# Patient Record
Sex: MALE | Race: WHITE | Employment: FULL TIME | URBAN - METROPOLITAN AREA
[De-identification: names, ages, dates, MRNs, and addresses within clinical notes are randomized per-mention and may not be internally consistent; named-entity substitution may affect disease eponyms.]

---

## 2019-07-24 ENCOUNTER — APPOINTMENT (OUTPATIENT)
Dept: GENERAL RADIOLOGY | Facility: CLINIC | Age: 49
End: 2019-07-24
Attending: EMERGENCY MEDICINE
Payer: COMMERCIAL

## 2019-07-24 ENCOUNTER — HOSPITAL ENCOUNTER (EMERGENCY)
Facility: CLINIC | Age: 49
Discharge: HOME | End: 2019-07-24
Attending: EMERGENCY MEDICINE | Admitting: EMERGENCY MEDICINE
Payer: COMMERCIAL

## 2019-07-24 VITALS
DIASTOLIC BLOOD PRESSURE: 99 MMHG | TEMPERATURE: 98.4 F | SYSTOLIC BLOOD PRESSURE: 132 MMHG | OXYGEN SATURATION: 99 % | RESPIRATION RATE: 16 BRPM | HEART RATE: 64 BPM

## 2019-07-24 DIAGNOSIS — S68.119A TRAUMATIC AMPUTATION OF FINGERTIP, INITIAL ENCOUNTER: ICD-10-CM

## 2019-07-24 PROCEDURE — 90471 IMMUNIZATION ADMIN: CPT | Performed by: EMERGENCY MEDICINE

## 2019-07-24 PROCEDURE — 99284 EMERGENCY DEPT VISIT MOD MDM: CPT | Mod: 25 | Performed by: EMERGENCY MEDICINE

## 2019-07-24 PROCEDURE — 25000131 ZZH RX MED GY IP 250 OP 636 PS 637: Performed by: EMERGENCY MEDICINE

## 2019-07-24 PROCEDURE — 25000125 ZZHC RX 250

## 2019-07-24 PROCEDURE — 12041 INTMD RPR N-HF/GENIT 2.5CM/<: CPT | Performed by: EMERGENCY MEDICINE

## 2019-07-24 PROCEDURE — 11044 DBRDMT BONE 1ST 20 SQ CM/<: CPT | Performed by: EMERGENCY MEDICINE

## 2019-07-24 PROCEDURE — 99284 EMERGENCY DEPT VISIT MOD MDM: CPT | Mod: Z6 | Performed by: EMERGENCY MEDICINE

## 2019-07-24 PROCEDURE — 96365 THER/PROPH/DIAG IV INF INIT: CPT | Performed by: EMERGENCY MEDICINE

## 2019-07-24 PROCEDURE — 25000128 H RX IP 250 OP 636: Performed by: EMERGENCY MEDICINE

## 2019-07-24 PROCEDURE — 90715 TDAP VACCINE 7 YRS/> IM: CPT | Performed by: EMERGENCY MEDICINE

## 2019-07-24 PROCEDURE — 73130 X-RAY EXAM OF HAND: CPT | Mod: LT

## 2019-07-24 RX ORDER — CEPHALEXIN 500 MG/1
500 CAPSULE ORAL 4 TIMES DAILY
Qty: 28 CAPSULE | Refills: 0 | Status: SHIPPED | OUTPATIENT
Start: 2019-07-24 | End: 2019-08-09

## 2019-07-24 RX ORDER — LIDOCAINE HYDROCHLORIDE 10 MG/ML
INJECTION, SOLUTION INFILTRATION; PERINEURAL
Status: COMPLETED
Start: 2019-07-24 | End: 2019-07-24

## 2019-07-24 RX ORDER — HYDROCODONE BITARTRATE AND ACETAMINOPHEN 5; 325 MG/1; MG/1
1 TABLET ORAL EVERY 6 HOURS PRN
Qty: 4 TABLET | Refills: 0 | Status: SHIPPED | OUTPATIENT
Start: 2019-07-24 | End: 2019-07-27

## 2019-07-24 RX ORDER — LIDOCAINE HYDROCHLORIDE 10 MG/ML
20 INJECTION, SOLUTION INFILTRATION; PERINEURAL ONCE
Status: COMPLETED | OUTPATIENT
Start: 2019-07-24 | End: 2019-07-24

## 2019-07-24 RX ORDER — CEFAZOLIN SODIUM 1 G/3ML
1 INJECTION, POWDER, FOR SOLUTION INTRAMUSCULAR; INTRAVENOUS ONCE
Status: COMPLETED | OUTPATIENT
Start: 2019-07-24 | End: 2019-07-24

## 2019-07-24 RX ORDER — BUPIVACAINE HYDROCHLORIDE 5 MG/ML
INJECTION, SOLUTION EPIDURAL; INTRACAUDAL
Status: DISCONTINUED
Start: 2019-07-24 | End: 2019-07-24 | Stop reason: HOSPADM

## 2019-07-24 RX ORDER — ONDANSETRON 8 MG/1
8 TABLET, ORALLY DISINTEGRATING ORAL ONCE
Status: COMPLETED | OUTPATIENT
Start: 2019-07-24 | End: 2019-07-24

## 2019-07-24 RX ORDER — IBUPROFEN 600 MG/1
600 TABLET, FILM COATED ORAL EVERY 8 HOURS PRN
Qty: 30 TABLET | Refills: 0 | Status: SHIPPED | OUTPATIENT
Start: 2019-07-24 | End: 2019-08-09

## 2019-07-24 RX ADMIN — ONDANSETRON 8 MG: 8 TABLET, ORALLY DISINTEGRATING ORAL at 18:01

## 2019-07-24 RX ADMIN — LIDOCAINE HYDROCHLORIDE: 10 INJECTION, SOLUTION INFILTRATION; PERINEURAL at 18:05

## 2019-07-24 RX ADMIN — CLOSTRIDIUM TETANI TOXOID ANTIGEN (FORMALDEHYDE INACTIVATED), CORYNEBACTERIUM DIPHTHERIAE TOXOID ANTIGEN (FORMALDEHYDE INACTIVATED), BORDETELLA PERTUSSIS TOXOID ANTIGEN (GLUTARALDEHYDE INACTIVATED), BORDETELLA PERTUSSIS FILAMENTOUS HEMAGGLUTININ ANTIGEN (FORMALDEHYDE INACTIVATED), BORDETELLA PERTUSSIS PERTACTIN ANTIGEN, AND BORDETELLA PERTUSSIS FIMBRIAE 2/3 ANTIGEN 0.5 ML: 5; 2; 2.5; 5; 3; 5 INJECTION, SUSPENSION INTRAMUSCULAR at 19:05

## 2019-07-24 RX ADMIN — CEFAZOLIN 1 G: 1 INJECTION, POWDER, FOR SOLUTION INTRAMUSCULAR; INTRAVENOUS at 18:58

## 2019-07-24 ASSESSMENT — ENCOUNTER SYMPTOMS
WOUND: 1
NAUSEA: 1

## 2019-07-24 NOTE — ED AVS SNAPSHOT
[facility], [place], Emergency Department  [address]  [facility] [place] [address]  Phone:  [phone]  Fax:  [phone]                                    Jose Francisco Cain   MRN: [id number]    Department:  [facility], Emergency Department   Date of Visit:  7/24/2019           After Visit Summary Signature Page    I have received my discharge instructions, and my questions have been answered. I have discussed any challenges I see with this plan with the nurse or doctor.    ..........................................................................................................................................  Patient/Patient Representative Signature      ..........................................................................................................................................  Patient Representative Print Name and Relationship to Patient    ..................................................               ................................................  Date                                   Time    ..........................................................................................................................................  Reviewed by Signature/Title    ...................................................              ..............................................  Date                                               Time          22EPIC Rev 08/18

## 2019-07-24 NOTE — CONSULTS
[facility]  ORTHOPAEDIC SURGERY CONSULT - HISTORY AND PHYSICAL    DATE OF CONSULT: 7/24/2019 6:14 PM    REQUESTING PROVIDER: Margret Kauffman *, MD - [facility] Staff.    CC: left hand injury     DATE OF INJURY: 07/24/19     HISTORY OF PRESENT ILLNESS:   Jose Francisco Cain is a 48 year old left-hand dominant male with no significant PMH who presents with a left hand injury.  Patient says that he was trimming his bushes earlier today with a power kaylynn when the tip of his left ring digit was caught in the power kaylynn.  This happened approximately at 5:30 PM.  His daughter brought him to the emergency room after putting his fingertip onto ice.  Prior to orthopedic evaluation he was anesthetized by the ER.  He reports no motor dysfunction.    Nursing and physician Emergency Department notes reviewed and HPI updated to reflect their ED course.     PAST MEDICAL HISTORY:     Patient denies any personal history of bleeding disorders, clotting disorders, or adverse reactions to anesthesia.    PAST SURGICAL HISTORY:   Denies    MEDICATIONS:   Prior to Admission medications    Medication Sig Last Dose Taking? Auth Provider   ANUSOL-HC 25 MG RE SUPP  1 MD QD AFTER A BM   Riley Carreon MD   BUTALBITAL-ACETAMINOPHEN  MG OR TABS 1 TABLET EVERY 4 HOURS AS NEEDED   Riley Carreon MD   FLONASE INHA 50 MCG/DOSE NA INHALE 2 SPRAYS IN EACH NOSTRIL ONCE DAILY   Riley Carreon MD   LEVITRA 20 MG OR TABS 1/2 TO 1 AS NEEDED   Riley Carreon MD   LOMOTIL 2.5-0.025 MG OR TABS 1 TABLET 4 TIMES DAILY AS NEEDED   Soniya Barbosa PA-C   MEDROL (KARY) 4 MG OR TABS AS DIRECTED   Riley Carreon MD   MIDRIN 325- MG OR CAPS ONE TO TWO CAPSULES EVERY 4 HOURS AS NEEDED   Riley Carreon MD   OVIDE 0.5 % EX LOTN apply overnight for self and 3 family members, repeat in 2 weeks   Riley Carreon MD   propranolol (INDERAL) 10 MG tablet Take  by mouth. 1 or 2 as needed before speech   Roxana  Jourdan KOO MD   VANCOCIN  MG OR CAPS 2 PO TID X 7 DAYS   Riley Carreon MD   VIAGRA 100 MG OR TABS 1 TABLET DAILY AS NEEDED   Riley Carreon MD   VICODIN 5-500 MG OR TABS ONE TO TWO TABLETS EVERY 4 TO 6 HOURS AS NEEDED FOR PAIN   Riley Carreon MD       ALLERGIES:   No known drug allergies    SOCIAL HISTORY:   Occupation:       FAMILY HISTORY:  Patient denies known family history of bleeding, clotting, or anesthesia related complications.     REVIEW OF SYSTEMS:   Otherwise, a 10-point reviews of systems was negative except as noted above in the HPI.     PHYSICAL EXAM:   Vitals:    07/24/19 1808   BP: 117/75   Pulse: 87   Resp: 16   Temp: 98  F (36.7  C)   TempSrc: Oral   SpO2: 100%     General: Awake, alert, appropriate, following commands, NAD.  Neuro: CN II-XII grossly intact.   Psych: Appropriate affect.   Skin: No rashes,  skin color normal.  HEENT: Normal.   Lungs: Breathing comfortably and nonlabored, no wheezes or stridor noted.  Heart/Cardiovascular: Regular pulse, no peripheral cyanosis.    Left Upper Extremity:     Focused exam of the patient's left ring finger shows a traumatic laceration through the distal phalanx.  There is associated bone exposed.  There is no pulsatile bleeding.  The patient's sensation is diminished due to previous anesthesia.  He fires FDP and FDS independently to all fingers.  Fires FPL, EPL.  Sensation is intact in all other digits.  He also has 2 small lacerations over the tuft of the long finger.  See pictures below.          LABS:  Hemoglobin   Date Value Ref Range Status   02/27/2004 15.7 13.3 - 17.7 g/dL Final   07/13/2003 15.1 13.3 - 17.7 g/dL Final     WBC   Date Value Ref Range Status   09/07/2007 8.0 4.0 - 11.0 10e9/L Final     Platelet Count   Date Value Ref Range Status   02/27/2004 227 150 - 450 10e9/L Final     No results found for: INR  Creatinine   Date Value Ref Range Status   09/07/2007 1.13 0.80 - 1.50 mg/dL Final     Glucose   Date  Value Ref Range Status   2007 94 60 - 99 mg/dL Final       IMAGIN views of the patient's left hand are reviewed today and are remarkable for transverse amputation through the distal phalanx of the left ring finger.  Associated soft tissue defect    ASSESSMENT AND PLAN:   Jose Francisco Cain is a 48 year old left-hand dominant male who sustained a left distal phalanx traumatic amputation and laceration to distal middle finger.     The patient underwent revision amputation of the distal phalanx with primary closure.  See separate procedure note below. We discussed that these injuries typically heal well.  We encouraged patient to keep his dressing clean dry and intact until Monday.  [place]  We will see him in clinic in approximately 5 to 7 days for wound check.  Red flag signs including drainage from the laceration, redness, fevers, chills and other symptoms were described.  He and his family verbalized understanding.         Assessment and Plan discussed with Dr. Haylie Car MD  Orthopedic Surgery PGY-4  [phone]      Verbal consent was obtained prior to the patient's procedure.  Risks including damage to nearby neurovascular structures, infection, hook nail deformities, necrosis of the finger and other complications were explained.  The patient underwent re-anesthetic with approximately 10 cc of half percent Marcaine without epinephrine and 1% lidocaine without epinephrine for a metacarpal block.  Once he was adequately anesthetized, the patient's wounds were then copiously irrigated with sterile saline.  The hand was then cleansed with Betadine solution and a rongeur was used to trim away any bony prominence from the ring finger. The remainder of the nail matrix was also removed.  The wounds were then closed in interrupted fashion using 4-0 chromic gut.  The patient's long finger laceration was also reapproximated with 4-0 chromic gut.  A  sterile dressing consisting of Xeroform, 4 x 4's, Kerlix and Coban was applied.  Patient was neurovascularly intact throughout the whole procedure.  He tolerated this well.

## 2019-07-24 NOTE — ED PROVIDER NOTES
[facility] EMERGENCY DEPARTMENT ([facility])    7/24/19          History     Chief Complaint   Patient presents with     Hand Injury     Ring finger amputation     The history is provided by the patient, a relative and medical records.     Jose Francisco Cain is a 48 year old male with no significant past medical history who presents here to the Emergency Department due to a the tip of the patients left hand 4th digit being cut off. Patient reports that he was trimming his bushes earlier today with a power kaylynn when the tip of his 4th digit on his left hand had gotten pulled into the timer and had been chopped off. Patient reports that this happened 20 minutes ago. Patient reports being left handed. Patient reports a lot of pain at the area and is reporting associated nausea. 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 Patient had put the tip of his finger on ice and brought it here. Patients last tetanus was on 1/5/2011.    I have reviewed the Medications, Allergies, Past Medical and Surgical History, and Social History in the Epic system.    No past medical history on file.    No past surgical history on file.    No family history on file.    Social History     Tobacco Use     Smoking status: Never Smoker   Substance Use Topics     Alcohol use: Not on file       Current Facility-Administered Medications   Medication     lidocaine 1 % injection     lidocaine 1 % injection 20 mL     Current Outpatient Medications   Medication     ANUSOL-HC 25 MG RE SUPP     BUTALBITAL-ACETAMINOPHEN  MG OR TABS     FLONASE INHA 50 MCG/DOSE NA     LEVITRA 20 MG OR TABS     LOMOTIL 2.5-0.025 MG OR TABS     MEDROL (KARY) 4 MG OR TABS     MIDRIN 325- MG OR CAPS     OVIDE 0.5 % EX LOTN     propranolol (INDERAL) 10 MG tablet     VANCOCIN  MG OR CAPS     VIAGRA 100 MG OR TABS     VICODIN 5-500 MG OR TABS        Allergies   Allergen Reactions     No Known Drug Allergies          Review of  Systems   Gastrointestinal: Positive for nausea.   Musculoskeletal:        Positive for left hand 4th digit pain   Skin: Positive for wound (Tip of 4th digit on left hand cut off).   All other systems reviewed and are negative.      Physical Exam          Physical Exam   Musculoskeletal:   Left 4th finger/ring finger with amputation distal to DIP joint; white bone visible;  Mild bleeding;  Able to flex and extend PIP joint;  Middle finger with 2 small lacerations to tip of finger;    Physical Exam   Constitutional: oriented to person, place, and time. appears well-developed and well-nourished.   HENT:   Head: Normocephalic and atraumatic.   Neck: Normal range of motion.   Pulmonary/Chest: Effort normal. No respiratory distress.   Neurological: alert and oriented to person, place, and time.   Skin: Skin is warm and dry.   Psychiatric:  normal mood and affect.  behavior is normal. Thought content normal.       ED Course   5:52 PM  The patient was seen and examined by Margret Kauffman MD in Room ED10.        Procedures             Critical Care time:  none         Results for orders placed or performed during the hospital encounter of 07/24/19   XR Hand Left G/E 3 Views    Narrative    LEFT HAND THREE VIEWS   7/24/2019 6:20 PM    HISTORY: Amputation of the distal phalanx of the ring finger.    COMPARISON: None.      Impression    IMPRESSION: 3 views of the left hand and 3 additional views of the  amputated digit were obtained. There is amputation of the distal half  of the distal phalanx of the ring finger including the soft tissues  and bone. No other abnormality is seen.     TATO CARBONE MD     Medications   ondansetron (ZOFRAN-ODT) ODT tab 8 mg (8 mg Oral Given 7/24/19 1801)   lidocaine 1 % injection 20 mL ( Intradermal Given by Other Clinician 7/24/19 1805)   ceFAZolin (ANCEF) 1 g vial to attach to  ml bag for ADULT or 50 ml bag for PEDS (0 g Intravenous Stopped 7/24/19 1935)   Tdap  (tetanus-diphtheria-acell pertussis) (ADACEL) injection 0.5 mL (0.5 mLs Intramuscular Given 7/24/19 3527)            Labs Ordered and Resulted from Time of ED Arrival Up to the Time of Departure from the ED - No data to display    No results found for this or any previous visit (from the past 24 hour(s)).         Assessments & Plan (with Medical Decision Making):   Patient is a 48-year-old male who presented to the ER due to a left ring finger amputation.  He was cutting some hedges at home and ended up cutting off the tip of his left phalanx.  He had a distal phalanx amputation just distal to the DIP joint.  I contacted orthopedics who came and evaluated the patient.  I initially performed a digital block for pain control.  He obtained some x-rays that confirmed the amputation.  Orthopedics does not feel that they can reimplant to the tip of this finger.  Patient's finger was rongeured and closed by orthopedics.  Patient did receive 1 dose of IV antibiotics in the ER and will be discharged home with some Keflex and some pain medications.  Patient stable for discharge.  This part of the medical record was transcribed by Dat Traore, Medical Scribe, from a dictation done by Margret Kauffman MD.      I have reviewed the nursing notes.    I have reviewed the findings, diagnosis, plan and need for follow up with the patient.       Medication List      There are no discharge medications for this visit.         Final diagnoses:   Traumatic amputation of fingertip, initial encounter     Dat JOHNSTON, am serving as a trained medical scribe to document services personally performed by Margret Kauffman MD, based on the provider's statements to me.   Margret JOHNSTON MD, was physically present and have reviewed and verified the accuracy of this note documented by Dat Traore.    7/24/2019   [facility], [place], EMERGENCY DEPARTMENT     Margret Kauffman MD  07/25/19 0026

## 2019-07-25 ENCOUNTER — TELEPHONE (OUTPATIENT)
Dept: ORTHOPEDICS | Facility: CLINIC | Age: 49
End: 2019-07-25

## 2019-07-25 NOTE — DISCHARGE INSTRUCTIONS
Keep finger clean and dry.     Take antibiotics as prescribed.     Please make an appointment to follow up with Your Primary Care Provider and Orthopedics (phone: [phone]) in 5-7 days.

## 2019-07-25 NOTE — TELEPHONE ENCOUNTER
L ring finger traumatic amputation now s/p revision in the ER on 07/24/19.    double booked on Dr. Liu's schedule on Monday 7/29/19 at 10:30 am or 2:00 pm

## 2019-07-26 ENCOUNTER — TELEPHONE (OUTPATIENT)
Dept: ORTHOPEDICS | Facility: CLINIC | Age: 49
End: 2019-07-26

## 2019-07-26 NOTE — TELEPHONE ENCOUNTER
----- Message from Henna Sneed ATC sent at 7/25/2019  8:23 AM CDT -----  This patient can be double booked on Dr. Liu's schedule on Monday 7/29/19 at 10:30 am or 2:00 pm. They also need insurance info put into the chart. Thanks!  ----- Message -----  From: Nydia Car MD  Sent: 7/24/2019   8:47 PM  To: [facility]    Jose Francisco Cain is a 48 year old M with L ring finger traumatic amputation now s/p revision in the ER on 07/24/19.     Recommend wound check with Dr. Stallings early next week(7/29) would be ideal.     Thanks!     Nydia Car

## 2019-08-09 ENCOUNTER — OFFICE VISIT (OUTPATIENT)
Dept: PEDIATRICS | Facility: CLINIC | Age: 49
End: 2019-08-09
Payer: COMMERCIAL

## 2019-08-09 VITALS
OXYGEN SATURATION: 99 % | TEMPERATURE: 98.2 F | SYSTOLIC BLOOD PRESSURE: 130 MMHG | WEIGHT: 200.2 LBS | BODY MASS INDEX: 28.03 KG/M2 | HEIGHT: 71 IN | DIASTOLIC BLOOD PRESSURE: 94 MMHG | HEART RATE: 88 BPM

## 2019-08-09 DIAGNOSIS — S69.92XD: Primary | ICD-10-CM

## 2019-08-09 DIAGNOSIS — S68.115A TRAUMATIC AMPUTATION OF LEFT RING FINGER: ICD-10-CM

## 2019-08-09 PROCEDURE — 99213 OFFICE O/P EST LOW 20 MIN: CPT | Performed by: INTERNAL MEDICINE

## 2019-08-09 RX ORDER — IBUPROFEN 200 MG
200-400 TABLET ORAL
COMMUNITY

## 2019-08-09 RX ORDER — DEXTROAMPHETAMINE SACCHARATE, AMPHETAMINE ASPARTATE MONOHYDRATE, DEXTROAMPHETAMINE SULFATE AND AMPHETAMINE SULFATE 7.5; 7.5; 7.5; 7.5 MG/1; MG/1; MG/1; MG/1
CAPSULE, EXTENDED RELEASE ORAL
COMMUNITY
Start: 2019-07-03

## 2019-08-09 ASSESSMENT — MIFFLIN-ST. JEOR: SCORE: 1800.23

## 2019-08-09 NOTE — PROGRESS NOTES
Emergency Room Follow Up    Jose Francisco Cain is a 48 year old male who presents to clinic today for the following health issues:    HPI   48-year-old gentleman sustained left hand ring and middle finger injury in the trimming accident.  He lost part of the distal phalanx of the left ring finger.  Tip of the middle finger as well.  He underwent surgery on 7/24/2000 comes in today for follow-up.  He has been keeping the finger clean and wrapping them.  He has not noticed any fever chills redness or streaking.  Pain is under control.    Hospital Follow-up Visit:    Hospital/Nursing Home/IP Rehab Facility: [facility] Emergency Department  Date of Admission: 07/24/19  Date of Discharge: 07/24/19  Reason(s) for Admission: Left 4th finger/ring amputation            Problems taking medications regularly:  None       Medication changes since discharge: None       Problems adhering to non-medication therapy:  None    Summary of hospitalization:  [facility] discharge summary reviewed  Diagnostic Tests/Treatments reviewed.  Follow up needed: none  Other Healthcare Providers Involved in Patient s Care:         None  Update since discharge: improved.     Post Discharge Medication Reconciliation: discharge medications reconciled, continue medications without change.  Plan of care communicated with patient     Coding guidelines for this visit:  Type of Medical   Decision Making Face-to-Face Visit       within 7 Days of discharge Face-to-Face Visit        within 14 days of discharge   Moderate Complexity [id number]   High Complexity [id number]                Patient Active Problem List   Diagnosis     Other isolated or specific phobias     Herpes simplex virus (HSV) infection     Impotence of organic origin     Headache     CARDIOVASCULAR SCREENING; LDL GOAL LESS THAN 160     History reviewed. No pertinent surgical history.    Social History     Tobacco Use     Smoking status: Never Smoker     Smokeless tobacco: Never Used  "  Substance Use Topics     Alcohol use: Not on file     History reviewed. No pertinent family history.      Current Outpatient Medications   Medication Sig Dispense Refill     amphetamine-dextroamphetamine (ADDERALL XR) 30 MG 24 hr capsule TAKE TWO CAPSULES BY MOUTH EVERY DAY       VIAGRA 100 MG OR TABS 1 TABLET DAILY AS NEEDED 10 prn     Amphetamine-Dextroamphetamine (ADDERALL PO) Take 60 mg by mouth       ibuprofen (ADVIL/MOTRIN) 200 MG tablet Take 200-400 mg by mouth       Allergies   Allergen Reactions     No Known Drug Allergies          Reviewed and updated as needed this visit by Provider         Review of Systems   ROS COMP: Constitutional, HEENT, cardiovascular, pulmonary, GI, , musculoskeletal, neuro, skin, endocrine and psych systems are negative, except as otherwise noted.      Objective    BP (!) 130/94 (BP Location: Right arm, Patient Position: Sitting, Cuff Size: Adult Regular)   Pulse 88   Temp 98.2  F (36.8  C) (Temporal)   Ht 1.803 m (5' 11\")   Wt 90.8 kg (200 lb 3.2 oz)   SpO2 99%   BMI 27.92 kg/m    Body mass index is 27.92 kg/m .  Physical Exam   GENERAL: healthy, alert and no distress  MS: Left hand examination reveals mid distal phalangeal amputation of the left middle finger.  Observable sutures were used and the suture site looks clean and wound is healed well.  The left middle finger also shows surgically treated amputation of the tip with well-healing wound.  There is slight edema of the ring finger but not of the middle finger.  There is no redness or streaking.  No discharge or drainage.    Diagnostic Test Results:  Labs reviewed in Epic        Assessment & Plan     1.  Accidental injury with partial amputation of the left hand middle and ring finger surgically treated.  Wound healing really well.  Patient advised to move his finger as much as he can and keep the wound open to air as much as he can.  Important to keep the wound clean.  Protected and rapid when working.  He can " "use soap and water.  Return if he notices redness, increased swelling, streaking or drainage.     BMI:   Estimated body mass index is 27.92 kg/m  as calculated from the following:    Height as of this encounter: 1.803 m (5' 11\").    Weight as of this encounter: 90.8 kg (200 lb 3.2 oz).               No follow-ups on file.    Robb Ayon MD  [facility]      "